# Patient Record
Sex: FEMALE | Race: WHITE | NOT HISPANIC OR LATINO | Employment: PART TIME | URBAN - METROPOLITAN AREA
[De-identification: names, ages, dates, MRNs, and addresses within clinical notes are randomized per-mention and may not be internally consistent; named-entity substitution may affect disease eponyms.]

---

## 2024-07-24 ENCOUNTER — LAB (OUTPATIENT)
Age: 39
End: 2024-07-24
Payer: OTHER GOVERNMENT

## 2024-07-24 ENCOUNTER — OFFICE VISIT (OUTPATIENT)
Age: 39
End: 2024-07-24
Payer: OTHER GOVERNMENT

## 2024-07-24 VITALS
SYSTOLIC BLOOD PRESSURE: 110 MMHG | BODY MASS INDEX: 27.62 KG/M2 | WEIGHT: 176 LBS | DIASTOLIC BLOOD PRESSURE: 72 MMHG | HEIGHT: 67 IN | OXYGEN SATURATION: 100 % | HEART RATE: 70 BPM

## 2024-07-24 DIAGNOSIS — R53.83 FATIGUE, UNSPECIFIED TYPE: Primary | ICD-10-CM

## 2024-07-24 DIAGNOSIS — R53.83 OTHER FATIGUE: Primary | ICD-10-CM

## 2024-07-24 DIAGNOSIS — E55.9 VITAMIN D DEFICIENCY: ICD-10-CM

## 2024-07-24 DIAGNOSIS — R53.83 OTHER FATIGUE: ICD-10-CM

## 2024-07-24 DIAGNOSIS — F41.1 GAD (GENERALIZED ANXIETY DISORDER): ICD-10-CM

## 2024-07-24 LAB
DEPRECATED RDW RBC AUTO: 43.5 FL (ref 37–54)
ERYTHROCYTE [DISTWIDTH] IN BLOOD BY AUTOMATED COUNT: 13.1 % (ref 12.3–15.4)
HCT VFR BLD AUTO: 38.2 % (ref 34–46.6)
HGB BLD-MCNC: 12.4 G/DL (ref 12–15.9)
MCH RBC QN AUTO: 29.2 PG (ref 26.6–33)
MCHC RBC AUTO-ENTMCNC: 32.5 G/DL (ref 31.5–35.7)
MCV RBC AUTO: 90.1 FL (ref 79–97)
PLATELET # BLD AUTO: 357 10*3/MM3 (ref 140–450)
PMV BLD AUTO: 10.6 FL (ref 6–12)
RBC # BLD AUTO: 4.24 10*6/MM3 (ref 3.77–5.28)
WBC NRBC COR # BLD AUTO: 8.37 10*3/MM3 (ref 3.4–10.8)

## 2024-07-24 PROCEDURE — 84443 ASSAY THYROID STIM HORMONE: CPT | Performed by: FAMILY MEDICINE

## 2024-07-24 PROCEDURE — 99204 OFFICE O/P NEW MOD 45 MIN: CPT | Performed by: FAMILY MEDICINE

## 2024-07-24 PROCEDURE — 85027 COMPLETE CBC AUTOMATED: CPT | Performed by: FAMILY MEDICINE

## 2024-07-24 PROCEDURE — 36415 COLL VENOUS BLD VENIPUNCTURE: CPT | Performed by: FAMILY MEDICINE

## 2024-07-24 PROCEDURE — 80053 COMPREHEN METABOLIC PANEL: CPT | Performed by: FAMILY MEDICINE

## 2024-07-24 PROCEDURE — 82306 VITAMIN D 25 HYDROXY: CPT | Performed by: FAMILY MEDICINE

## 2024-07-24 PROCEDURE — 82607 VITAMIN B-12: CPT | Performed by: FAMILY MEDICINE

## 2024-07-24 RX ORDER — SERTRALINE HYDROCHLORIDE 25 MG/1
25 TABLET, FILM COATED ORAL DAILY
Qty: 30 TABLET | Refills: 1 | Status: SHIPPED | OUTPATIENT
Start: 2024-07-24

## 2024-07-24 RX ORDER — MULTIPLE VITAMINS W/ MINERALS TAB 9MG-400MCG
1 TAB ORAL DAILY
COMMUNITY

## 2024-07-24 RX ORDER — VITAMIN B COMPLEX
CAPSULE ORAL DAILY
COMMUNITY

## 2024-07-24 NOTE — PROGRESS NOTES
"Chief Complaint  Establish Care and Fatigue    Subjective        Gayle Winters presents to [facility]  Fatigue  This is a chronic problem. Episode onset: several years. Associated symptoms include fatigue.     She is really tired and no energy. A few years ago she was low on vitamin D a few years ago. She just started back on vitamins a week ago. She takes vitamin D, B complex, magnesium, and multivitamin. She has a little of anxiety. Special needs daughter who doesn't sleep well at night. Wakes up 12 am or 2 am and stays up for several hours and she is up with her daughter. When she is more stressed she is more tired. She doesn't take naps. Regular menstrual cycles with normal flow.           7/24/2024     3:29 PM   PHQ-2/PHQ-9 Depression Screening   Little Interest or Pleasure in Doing Things 0-->not at all   Feeling Down, Depressed or Hopeless 0-->not at all   PHQ-9: Brief Depression Severity Measure Score 0         Objective   Vital Signs:  /72   Pulse 70   Ht 168.9 cm (66.5\")   Wt 79.8 kg (176 lb)   SpO2 100%   BMI 27.98 kg/m²   Estimated body mass index is 27.98 kg/m² as calculated from the following:    Height as of this encounter: 168.9 cm (66.5\").    Weight as of this encounter: 79.8 kg (176 lb).             Physical Exam  Vitals reviewed.   Constitutional:       General: She is not in acute distress.     Appearance: She is overweight. She is not ill-appearing.   Neck:      Thyroid: No thyroid mass, thyromegaly or thyroid tenderness.   Cardiovascular:      Rate and Rhythm: Normal rate and regular rhythm.   Pulmonary:      Effort: Pulmonary effort is normal.      Breath sounds: Normal breath sounds.   Abdominal:      Palpations: There is no hepatomegaly or splenomegaly.      Tenderness: There is no abdominal tenderness.   Neurological:      Mental Status: She is alert.   Psychiatric:         Behavior: Behavior normal.         Thought Content: Thought content " normal.         Judgment: Judgment normal.        Result Review :          25-HYDROXYVITAMIN D LCMS D2+D3 (11/29/2022 16:29)  COMPREHENSIVE METABOLIC PANEL (11/29/2022 16:29)  VITAMIN B12 (11/29/2022 16:29)  TSH, High Sensitivity (11/29/2022 16:29)           Assessment and Plan     Diagnoses and all orders for this visit:    1. Other fatigue (Primary)  -     Comprehensive Metabolic Panel; Future  -     TSH Rfx On Abnormal To Free T4; Future  -     CBC (No Diff); Future  -     Vitamin B12; Future    2. Vitamin D deficiency  -     Vitamin D,25-Hydroxy; Future    3. KIM (generalized anxiety disorder)  -     sertraline (ZOLOFT) 25 MG tablet; Take 1 tablet by mouth Daily.  Dispense: 30 tablet; Refill: 1    Further investigation of fatigue with labs today.  Discussed other possibility could also include stress.  She is open to starting SSRI for anxiety.  Discussed side effects to monitor for.  Notify me if any changes in her health.  Reassess at follow-up visit in 1 month.         Follow Up     Return in about 1 month (around 8/24/2024) for [place] Video Visit anxiety .  Patient was given instructions and counseling regarding her condition or for health maintenance advice. Please see specific information pulled into the AVS if appropriate.     Electronically signed by Karine Olguin MD, 07/24/24, 4:23 PM EDT.

## 2024-07-25 LAB
25(OH)D3 SERPL-MCNC: 33.9 NG/ML (ref 30–100)
ALBUMIN SERPL-MCNC: 4.4 G/DL (ref 3.5–5.2)
ALBUMIN/GLOB SERPL: 1.3 G/DL
ALP SERPL-CCNC: 61 U/L (ref 39–117)
ALT SERPL W P-5'-P-CCNC: 13 U/L (ref 1–33)
ANION GAP SERPL CALCULATED.3IONS-SCNC: 11 MMOL/L (ref 5–15)
AST SERPL-CCNC: 19 U/L (ref 1–32)
BILIRUB SERPL-MCNC: 0.4 MG/DL (ref 0–1.2)
BUN SERPL-MCNC: 12 MG/DL (ref 6–20)
BUN/CREAT SERPL: 15.8 (ref 7–25)
CALCIUM SPEC-SCNC: 9.3 MG/DL (ref 8.6–10.5)
CHLORIDE SERPL-SCNC: 105 MMOL/L (ref 98–107)
CO2 SERPL-SCNC: 24 MMOL/L (ref 22–29)
CREAT SERPL-MCNC: 0.76 MG/DL (ref 0.57–1)
EGFRCR SERPLBLD CKD-EPI 2021: 103 ML/MIN/1.73
GLOBULIN UR ELPH-MCNC: 3.4 GM/DL
GLUCOSE SERPL-MCNC: 84 MG/DL (ref 65–99)
POTASSIUM SERPL-SCNC: 3.9 MMOL/L (ref 3.5–5.2)
PROT SERPL-MCNC: 7.8 G/DL (ref 6–8.5)
SODIUM SERPL-SCNC: 140 MMOL/L (ref 136–145)
TSH SERPL DL<=0.05 MIU/L-ACNC: 0.88 UIU/ML (ref 0.27–4.2)
VIT B12 BLD-MCNC: 566 PG/ML (ref 211–946)

## 2025-07-17 ENCOUNTER — OFFICE VISIT (OUTPATIENT)
Age: 40
End: 2025-07-17
Payer: OTHER GOVERNMENT

## 2025-07-17 ENCOUNTER — LAB (OUTPATIENT)
Facility: HOSPITAL | Age: 40
End: 2025-07-17
Payer: OTHER GOVERNMENT

## 2025-07-17 VITALS
SYSTOLIC BLOOD PRESSURE: 116 MMHG | RESPIRATION RATE: 16 BRPM | DIASTOLIC BLOOD PRESSURE: 70 MMHG | WEIGHT: 188 LBS | BODY MASS INDEX: 29.89 KG/M2

## 2025-07-17 DIAGNOSIS — O20.9 FIRST TRIMESTER BLEEDING: Primary | ICD-10-CM

## 2025-07-17 DIAGNOSIS — Z01.419 ENCOUNTER FOR WELL WOMAN EXAM WITH ROUTINE GYNECOLOGICAL EXAM: ICD-10-CM

## 2025-07-17 DIAGNOSIS — O20.9 FIRST TRIMESTER BLEEDING: ICD-10-CM

## 2025-07-17 LAB
ABO GROUP BLD: NORMAL
RH BLD: NEGATIVE

## 2025-07-17 PROCEDURE — 84702 CHORIONIC GONADOTROPIN TEST: CPT

## 2025-07-17 PROCEDURE — 86900 BLOOD TYPING SEROLOGIC ABO: CPT

## 2025-07-17 PROCEDURE — 86901 BLOOD TYPING SEROLOGIC RH(D): CPT

## 2025-07-17 PROCEDURE — 36415 COLL VENOUS BLD VENIPUNCTURE: CPT

## 2025-07-17 RX ORDER — PRENATAL VIT/IRON FUM/FOLIC AC 27MG-0.8MG
TABLET ORAL DAILY
COMMUNITY

## 2025-07-17 NOTE — PROGRESS NOTES
Subjective   Chief Complaint   Patient presents with    Annual Exam     Has had 3 positive UPt's this week     Gayle Winters is a 40 y.o. year old No obstetric history on file..  Patient's last menstrual period was 2025.  She presents to be seen because of first trimester spotting. Initially she was scheduled for an annual visit but she had 3 positive UPTs this week. At time of visit she noted some dark brown spotting and became emotional and wanted to skip doing a pap smear today and discuss bleeding.     She is new to this area and is a . Both uncomplicated pregnancies but second baby was born with tuberous sclerosis complex (she and her  both tested negative for genetic screening but they were told this was a spontaneous mutation). She is also AMA. She has no other complicating factors. She has been taking a prenatal vitamin. She reports she had some mild cramping Mon and Tues of this week, but is not now.     History of Present Illness         OTHER THINGS SHE WANTS TO DISCUSS TODAY:  Nothing else    The following portions of the patient's history were reviewed and updated as appropriate:current medications, allergies, past medical history, and past social history    Social History    Tobacco Use      Smoking status: Never        Passive exposure: Never      Smokeless tobacco: Never      Review of Systems        Objective   /70   Resp 16   Wt 85.3 kg (188 lb)   LMP 2025   BMI 29.89 kg/m²     Physical Exam  Vitals and nursing note reviewed.   Psychiatric:         Mood and Affect: Mood normal.         Behavior: Behavior normal.         Thought Content: Thought content normal.         Judgment: Judgment normal.         Physical Exam         Lab Review   No data reviewed    Imaging   No data reviewed     Results            Assessment & Plan   Diagnoses and all orders for this visit:    1. First trimester bleeding (Primary)  -     hCG, Quantitative, Pregnancy; Future  -     hCG,  Quantitative, Pregnancy; Future  -     US Ob Transvaginal; Future  -     ABO / Rh; Future    2. Encounter for well woman exam with routine gynecological exam  -     LIQUID-BASED PAP SMEAR WITH HPV GENOTYPING REGARDLESS OF INTERPRETATION (KENISHA,COR,MAD); Future  -     Cancel: LIQUID-BASED PAP SMEAR WITH HPV GENOTYPING REGARDLESS OF INTERPRETATION (KENISHA,COR,MAD)    Pap not done today, as patient wanted to pivot visit from annual exam once she experienced bleeding. Will get serial HCGs and have her back for ultrasound early next week as ultrasound not available today in this clinic. ED precautions given, advised Gayle to abstain from intercourse until we know for sure what is going on. She voiced understanding and appreciation.    The importance of keeping all planned follow-up and taking all medications as prescribed was emphasized.    Return in about 4 days (around 7/21/2025).    No orders of the defined types were placed in this encounter.                Assessment & Plan         This note was electronically signed.    Francisca Garcia, APRN  July 17, 2025

## 2025-07-18 LAB — HCG INTACT+B SERPL-ACNC: 448 MIU/ML

## 2025-07-19 ENCOUNTER — LAB (OUTPATIENT)
Dept: LAB | Facility: HOSPITAL | Age: 40
End: 2025-07-19
Payer: OTHER GOVERNMENT

## 2025-07-19 DIAGNOSIS — O20.9 FIRST TRIMESTER BLEEDING: ICD-10-CM

## 2025-07-19 LAB — HCG INTACT+B SERPL-ACNC: 820 MIU/ML

## 2025-07-19 PROCEDURE — 36415 COLL VENOUS BLD VENIPUNCTURE: CPT

## 2025-07-19 PROCEDURE — 84702 CHORIONIC GONADOTROPIN TEST: CPT

## 2025-07-21 ENCOUNTER — LAB (OUTPATIENT)
Dept: LAB | Facility: HOSPITAL | Age: 40
End: 2025-07-21
Payer: OTHER GOVERNMENT

## 2025-07-21 ENCOUNTER — OFFICE VISIT (OUTPATIENT)
Dept: OBSTETRICS AND GYNECOLOGY | Facility: CLINIC | Age: 40
End: 2025-07-21
Payer: OTHER GOVERNMENT

## 2025-07-21 VITALS
DIASTOLIC BLOOD PRESSURE: 70 MMHG | BODY MASS INDEX: 30.84 KG/M2 | SYSTOLIC BLOOD PRESSURE: 116 MMHG | WEIGHT: 194 LBS | RESPIRATION RATE: 16 BRPM

## 2025-07-21 DIAGNOSIS — O20.9 FIRST TRIMESTER BLEEDING: ICD-10-CM

## 2025-07-21 DIAGNOSIS — O20.9 FIRST TRIMESTER BLEEDING: Primary | ICD-10-CM

## 2025-07-21 LAB — HCG INTACT+B SERPL-ACNC: 934 MIU/ML

## 2025-07-21 PROCEDURE — 36415 COLL VENOUS BLD VENIPUNCTURE: CPT

## 2025-07-21 PROCEDURE — 84702 CHORIONIC GONADOTROPIN TEST: CPT

## 2025-07-21 PROCEDURE — 99213 OFFICE O/P EST LOW 20 MIN: CPT

## 2025-07-21 NOTE — PROGRESS NOTES
Subjective   Chief Complaint   Patient presents with    Follow-up     U/s done today     Gayle Winters is a 40 y.o. year old No obstetric history on file..  Patient's last menstrual period was 06/13/2025.  She presents to be seen because of follow up on first trimester bleeding. She reports occasional, albeit lighter, dark brown spotting/bleeding over the weekend. Mild cramping yesterday, none today so far. No other concerns.     History of Present Illness         OTHER THINGS SHE WANTS TO DISCUSS TODAY:  Nothing else    The following portions of the patient's history were reviewed and updated as appropriate:current medications and allergies    Social History    Tobacco Use      Smoking status: Never        Passive exposure: Never      Smokeless tobacco: Never      Review of Systems        Objective   /70   Resp 16   Wt 88 kg (194 lb)   LMP 06/13/2025   BMI 30.84 kg/m²     Physical Exam  Vitals and nursing note reviewed.   Psychiatric:         Mood and Affect: Mood normal.         Behavior: Behavior normal.         Thought Content: Thought content normal.         Judgment: Judgment normal.         Physical Exam         Lab Review   HCG    Imaging   Pelvic ultrasound report     Results            Assessment & Plan   Diagnoses and all orders for this visit:    1. First trimester bleeding (Primary)  -     US Ob Transvaginal; Future  -     hCG, Quantitative, Pregnancy; Future    HCG nearly doubled from Thurs to Sat; will have her get one more today to follow trend. TVUS today shows no adnexal concerns, and questionable gestational sac, but no yolk sac seen as of yet. Will have her back in 1 week for repeat u/s and will make decision on further hcg levels based on today's level. Reiterated ED precautions for pain and bleeding. She voiced understanding and appreciation.     The importance of keeping all planned follow-up and taking all medications as prescribed was emphasized.    Return in about 1 week (around  7/28/2025) for Repeat u/s.    No orders of the defined types were placed in this encounter.                Assessment & Plan         This note was electronically signed.    Francisca Garcia, JOHNNY  July 21, 2025

## 2025-07-23 ENCOUNTER — CLINICAL SUPPORT (OUTPATIENT)
Dept: OBSTETRICS AND GYNECOLOGY | Facility: CLINIC | Age: 40
End: 2025-07-23
Payer: OTHER GOVERNMENT

## 2025-07-23 ENCOUNTER — LAB (OUTPATIENT)
Dept: LAB | Facility: HOSPITAL | Age: 40
End: 2025-07-23
Payer: OTHER GOVERNMENT

## 2025-07-23 DIAGNOSIS — O20.9 FIRST TRIMESTER BLEEDING: ICD-10-CM

## 2025-07-23 DIAGNOSIS — O26.859 SPOTTING IN PREGNANCY: Primary | ICD-10-CM

## 2025-07-23 LAB — HCG INTACT+B SERPL-ACNC: 1263 MIU/ML

## 2025-07-23 PROCEDURE — 36415 COLL VENOUS BLD VENIPUNCTURE: CPT

## 2025-07-23 PROCEDURE — 84702 CHORIONIC GONADOTROPIN TEST: CPT

## 2025-07-29 ENCOUNTER — LAB (OUTPATIENT)
Dept: LAB | Facility: HOSPITAL | Age: 40
End: 2025-07-29
Payer: OTHER GOVERNMENT

## 2025-07-29 ENCOUNTER — OFFICE VISIT (OUTPATIENT)
Dept: OBSTETRICS AND GYNECOLOGY | Facility: CLINIC | Age: 40
End: 2025-07-29
Payer: OTHER GOVERNMENT

## 2025-07-29 VITALS
BODY MASS INDEX: 30.37 KG/M2 | RESPIRATION RATE: 16 BRPM | WEIGHT: 191 LBS | SYSTOLIC BLOOD PRESSURE: 116 MMHG | DIASTOLIC BLOOD PRESSURE: 70 MMHG

## 2025-07-29 DIAGNOSIS — R30.0 DYSURIA: ICD-10-CM

## 2025-07-29 DIAGNOSIS — O36.80X0 PREGNANCY OF UNKNOWN ANATOMIC LOCATION: ICD-10-CM

## 2025-07-29 DIAGNOSIS — O36.80X0 PREGNANCY OF UNKNOWN ANATOMIC LOCATION: Primary | ICD-10-CM

## 2025-07-29 LAB
ALBUMIN SERPL-MCNC: 4.2 G/DL (ref 3.5–5.2)
ALBUMIN/GLOB SERPL: 1.4 G/DL
ALP SERPL-CCNC: 54 U/L (ref 39–117)
ALT SERPL W P-5'-P-CCNC: 24 U/L (ref 1–33)
ANION GAP SERPL CALCULATED.3IONS-SCNC: 9.6 MMOL/L (ref 5–15)
AST SERPL-CCNC: 23 U/L (ref 1–32)
BACTERIA UR QL AUTO: ABNORMAL /HPF
BILIRUB SERPL-MCNC: 0.4 MG/DL (ref 0–1.2)
BILIRUB UR QL STRIP: NEGATIVE
BUN SERPL-MCNC: 11 MG/DL (ref 6–20)
BUN/CREAT SERPL: 15.9 (ref 7–25)
CALCIUM SPEC-SCNC: 9.4 MG/DL (ref 8.6–10.5)
CHLORIDE SERPL-SCNC: 105 MMOL/L (ref 98–107)
CLARITY UR: ABNORMAL
CO2 SERPL-SCNC: 23.4 MMOL/L (ref 22–29)
COLOR UR: YELLOW
CREAT SERPL-MCNC: 0.69 MG/DL (ref 0.57–1)
DEPRECATED RDW RBC AUTO: 42.6 FL (ref 37–54)
EGFRCR SERPLBLD CKD-EPI 2021: 113.4 ML/MIN/1.73
ERYTHROCYTE [DISTWIDTH] IN BLOOD BY AUTOMATED COUNT: 13.2 % (ref 12.3–15.4)
GLOBULIN UR ELPH-MCNC: 3.1 GM/DL
GLUCOSE SERPL-MCNC: 85 MG/DL (ref 65–99)
GLUCOSE UR STRIP-MCNC: NEGATIVE MG/DL
HCG INTACT+B SERPL-ACNC: 1704 MIU/ML
HCT VFR BLD AUTO: 37.8 % (ref 34–46.6)
HGB BLD-MCNC: 12.4 G/DL (ref 12–15.9)
HGB UR QL STRIP.AUTO: ABNORMAL
HOLD SPECIMEN: NORMAL
HYALINE CASTS UR QL AUTO: ABNORMAL /LPF
KETONES UR QL STRIP: NEGATIVE
LEUKOCYTE ESTERASE UR QL STRIP.AUTO: ABNORMAL
MCH RBC QN AUTO: 29.5 PG (ref 26.6–33)
MCHC RBC AUTO-ENTMCNC: 32.8 G/DL (ref 31.5–35.7)
MCV RBC AUTO: 90 FL (ref 79–97)
NITRITE UR QL STRIP: POSITIVE
PH UR STRIP.AUTO: 6 [PH] (ref 5–8)
PLATELET # BLD AUTO: 334 10*3/MM3 (ref 140–450)
PMV BLD AUTO: 10.1 FL (ref 6–12)
POTASSIUM SERPL-SCNC: 4.2 MMOL/L (ref 3.5–5.2)
PROT SERPL-MCNC: 7.3 G/DL (ref 6–8.5)
PROT UR QL STRIP: NEGATIVE
RBC # BLD AUTO: 4.2 10*6/MM3 (ref 3.77–5.28)
RBC # UR STRIP: ABNORMAL /HPF
REF LAB TEST METHOD: ABNORMAL
SODIUM SERPL-SCNC: 138 MMOL/L (ref 136–145)
SP GR UR STRIP: 1.01 (ref 1–1.03)
SQUAMOUS #/AREA URNS HPF: ABNORMAL /HPF
UROBILINOGEN UR QL STRIP: ABNORMAL
WBC # UR STRIP: ABNORMAL /HPF
WBC NRBC COR # BLD AUTO: 7.41 10*3/MM3 (ref 3.4–10.8)

## 2025-07-29 PROCEDURE — 87077 CULTURE AEROBIC IDENTIFY: CPT

## 2025-07-29 PROCEDURE — 81001 URINALYSIS AUTO W/SCOPE: CPT

## 2025-07-29 PROCEDURE — 36415 COLL VENOUS BLD VENIPUNCTURE: CPT

## 2025-07-29 PROCEDURE — 87186 SC STD MICRODIL/AGAR DIL: CPT

## 2025-07-29 PROCEDURE — 99213 OFFICE O/P EST LOW 20 MIN: CPT

## 2025-07-29 PROCEDURE — 85027 COMPLETE CBC AUTOMATED: CPT

## 2025-07-29 PROCEDURE — 80053 COMPREHEN METABOLIC PANEL: CPT

## 2025-07-29 PROCEDURE — 84702 CHORIONIC GONADOTROPIN TEST: CPT

## 2025-07-29 PROCEDURE — 87086 URINE CULTURE/COLONY COUNT: CPT

## 2025-07-29 NOTE — PROGRESS NOTES
Subjective   Chief Complaint   Patient presents with    Follow-up     U/s done today     Gayle Winters is a 40 y.o. year old No obstetric history on file..  Patient's last menstrual period was 06/13/2025.  She presents to be seen because of pregnancy of unknown location. She reports she has still had some spotting on and off this week. No clots and nothing that looks like definitive tissue, mostly brown. Mild cramping similar to menses. No other pelvic pain.     History of Present Illness         OTHER THINGS SHE WANTS TO DISCUSS TODAY:  Nothing else    The following portions of the patient's history were reviewed and updated as appropriate:current medications and allergies    Social History    Tobacco Use      Smoking status: Never        Passive exposure: Never      Smokeless tobacco: Never      Review of Systems        Objective   /70   Resp 16   Wt 86.6 kg (191 lb)   LMP 06/13/2025   BMI 30.37 kg/m²     Physical Exam  Vitals and nursing note reviewed. Exam conducted with a chaperone present.   Psychiatric:         Mood and Affect: Mood normal.         Behavior: Behavior normal.         Thought Content: Thought content normal.         Judgment: Judgment normal.         Physical Exam         Lab Review   No data reviewed    Imaging   Pelvic ultrasound report     Results            Assessment & Plan   Diagnoses and all orders for this visit:    1. Pregnancy of unknown anatomic location (Primary)  -     hCG, Quantitative, Pregnancy; Future  -     Comprehensive Metabolic Panel; Future  -     CBC (No Diff); Future  -     US Ob Transvaginal; Future    2. Dysuria  -     Urinalysis With Culture If Indicated - Urine, Clean Catch; Future  -     Urinalysis With Culture If Indicated - Urine, Clean Catch      TVUS today continues to show unknown location of pregnancy. Suspicious area seen in left adnexal region, as well as thickened endometrium seen. No fetal pole or embryo visualized as of yet. Will await MD  impression as well as repeat hcg and get CMP and CBC in case of need for methotrexate.     The importance of keeping all planned follow-up and taking all medications as prescribed was emphasized.    Return in about 6 days (around 8/4/2025) for Recheck.    No orders of the defined types were placed in this encounter.                Assessment & Plan         This note was electronically signed.    Francisca Garcia, JOHNNY  July 29, 2025

## 2025-07-30 ENCOUNTER — TELEPHONE (OUTPATIENT)
Dept: OBSTETRICS AND GYNECOLOGY | Facility: CLINIC | Age: 40
End: 2025-07-30

## 2025-07-31 ENCOUNTER — TELEPHONE (OUTPATIENT)
Dept: OBSTETRICS AND GYNECOLOGY | Facility: CLINIC | Age: 40
End: 2025-07-31
Payer: OTHER GOVERNMENT

## 2025-07-31 DIAGNOSIS — O36.80X0 PREGNANCY OF UNKNOWN ANATOMIC LOCATION: Primary | ICD-10-CM

## 2025-07-31 NOTE — TELEPHONE ENCOUNTER
Called  and she stated she was feeling pretty good, no bleeding, left side sort of tightening of muscle, a little pain.  She was instructed if she had pain or bleeding she was to go to ED. Quantative HCG and ultrasound was ordered. She will have those done and see Dr. Briones tomorrow.

## 2025-07-31 NOTE — TELEPHONE ENCOUNTER
Caller: Gayle Winters    Relationship: Self    Best call back number: [phone]    What is the best time to reach you: ASAP    Do you know the name of the person who called: ILIA    What was the call regarding: MISSED CALL    Is it okay if the provider responds through MyChart: CALL BACK

## 2025-08-01 ENCOUNTER — OFFICE VISIT (OUTPATIENT)
Dept: OBSTETRICS AND GYNECOLOGY | Facility: CLINIC | Age: 40
End: 2025-08-01
Payer: OTHER GOVERNMENT

## 2025-08-01 ENCOUNTER — LAB (OUTPATIENT)
Dept: LAB | Facility: HOSPITAL | Age: 40
End: 2025-08-01
Payer: OTHER GOVERNMENT

## 2025-08-01 VITALS — RESPIRATION RATE: 14 BRPM | BODY MASS INDEX: 30.21 KG/M2 | WEIGHT: 190 LBS

## 2025-08-01 DIAGNOSIS — O36.80X0 PREGNANCY OF UNKNOWN ANATOMIC LOCATION: ICD-10-CM

## 2025-08-01 LAB — HCG INTACT+B SERPL-ACNC: 1696 MIU/ML

## 2025-08-01 PROCEDURE — 36415 COLL VENOUS BLD VENIPUNCTURE: CPT

## 2025-08-01 PROCEDURE — 84702 CHORIONIC GONADOTROPIN TEST: CPT

## 2025-08-01 NOTE — PROGRESS NOTES
Subjective   Chief Complaint   Patient presents with    Follow-up       Gayle Winters is a 40 y.o. year old .  Patient's last menstrual period was 2025.  She comes in for follow-up.  She has had inappropriately rising hCGs.  Her last hCG was about 1700.  That was a couple days ago.  She had an ultrasound prior to seeing me today.  The ultrasound does show no evidence of an intrauterine gestational sac.  There is no fluid in the cul-de-sac.  There is no definitive evidence of an ectopic pregnancy in the adnexa although there is a suspicious area that could be consistent with an ectopic.    The following portions of the patient's history were reviewed and updated as appropriate:current medications, allergies, past family history, past medical history, past social history, and past surgical history    Social History    Tobacco Use      Smoking status: Never        Passive exposure: Never      Smokeless tobacco: Never         Objective   Resp 14   Wt 86.2 kg (190 lb)   LMP 2025   BMI 30.21 kg/m²     Lab Review   See HPI    Imaging   See HPI        Assessment   Pregnancy of unknown location with sonographic evidence highly suspicious for ectopic pregnancy but cannot exclude  Rh-.  She has received RhoGAM recently.         Plan   Really need to see today's hCG and correlate today's findings with the ultrasound  Have explained that my partner, Karly Campbell who is on-call this weekend and I really think we need to get the hCG reviewed to discuss these findings.  Ectopic warnings have been reviewed  The importance of keeping all planned follow-up and taking all medications as prescribed was emphasized.  Follow up pending hCGs            This note was electronically signed.    Thaddeus Briones M.D.  2025      Part of this note may be an electronic transcription/translation of spoken language to printed text using the Dragon Dictation System.

## 2025-08-01 NOTE — Clinical Note
You may want to take a look at this lab work which I have ordered stat.  Right now I think she would be a an methotrexate candidate pending review of labs

## 2025-08-02 ENCOUNTER — HOSPITAL ENCOUNTER (EMERGENCY)
Facility: HOSPITAL | Age: 40
Discharge: HOME | End: 2025-08-02
Attending: EMERGENCY MEDICINE
Payer: OTHER GOVERNMENT

## 2025-08-02 VITALS
HEIGHT: 67 IN | RESPIRATION RATE: 16 BRPM | WEIGHT: 190.04 LBS | BODY MASS INDEX: 29.83 KG/M2 | SYSTOLIC BLOOD PRESSURE: 126 MMHG | HEART RATE: 66 BPM | TEMPERATURE: 98.4 F | OXYGEN SATURATION: 100 % | DIASTOLIC BLOOD PRESSURE: 86 MMHG

## 2025-08-02 DIAGNOSIS — O00.102 LEFT TUBAL PREGNANCY WITHOUT INTRAUTERINE PREGNANCY: Primary | ICD-10-CM

## 2025-08-02 DIAGNOSIS — O00.90 ECTOPIC PREGNANCY, UNSPECIFIED LOCATION, UNSPECIFIED WHETHER INTRAUTERINE PREGNANCY PRESENT: Primary | ICD-10-CM

## 2025-08-02 LAB — BACTERIA SPEC AEROBE CULT: ABNORMAL

## 2025-08-02 PROCEDURE — 25010000002 METHOTREXATE PF 100 MG/4ML SOLUTION: Performed by: STUDENT IN AN ORGANIZED HEALTH CARE EDUCATION/TRAINING PROGRAM

## 2025-08-02 PROCEDURE — 99282 EMERGENCY DEPT VISIT SF MDM: CPT

## 2025-08-02 PROCEDURE — 96372 THER/PROPH/DIAG INJ SC/IM: CPT

## 2025-08-02 RX ORDER — METHOTREXATE 25 MG/ML
50 INJECTION, SOLUTION INTRA-ARTERIAL; INTRAMUSCULAR; INTRATHECAL; INTRAVENOUS ONCE
Status: DISCONTINUED | OUTPATIENT
Start: 2025-08-02 | End: 2025-08-02

## 2025-08-02 RX ORDER — METHOTREXATE 25 MG/ML
50 INJECTION, SOLUTION INTRA-ARTERIAL; INTRAMUSCULAR; INTRATHECAL; INTRAVENOUS ONCE
Status: COMPLETED | OUTPATIENT
Start: 2025-08-02 | End: 2025-08-02

## 2025-08-02 RX ADMIN — METHOTREXATE 49 MG: 25 SOLUTION INTRA-ARTERIAL; INTRAMUSCULAR; INTRATHECAL; INTRAVENOUS at 16:40

## 2025-08-02 NOTE — ED PROVIDER NOTES
EMERGENCY DEPARTMENT ENCOUNTER    Pt Name: Gayle Winters  MRN: [id number]  Pt :   1985  Room Number:  24SF/24  Date of encounter:  2025  PCP: Karine Olguin MD  ED Provider: JOHNNY Summers    Historian: Patient    HPI:  Chief Complaint:  ectopic pregnancy    Context: Gayle Winters is a 40 y.o. female who presents to the ED c/o ectopic pregnancy. Pt is a .  Patient's last menstrual period was on .  Patient has had multiple ultrasounds and beta-hCG's performed.  Patient's beta-hCG is not increasing and the ultrasound shows concern of an ectopic.  Patient is followed per Dr. Campbell.  Dr. Campbell and the patient had extensive conversations today.  Patient is sent here for methotrexate injection.  HPI     REVIEW OF SYSTEMS  A chief complaint appropriate review of systems was completed and is negative except as noted in the HPI.     PAST MEDICAL HISTORY  No past medical history on file.    PAST SURGICAL HISTORY  Past Surgical History:   Procedure Laterality Date    WISDOM TOOTH EXTRACTION         FAMILY HISTORY  Family History   Problem Relation Age of Onset    Arthritis Mother     Migraines Mother     Stroke Father 55    No Known Problems Sister     No Known Problems Sister     Diabetes Maternal Grandmother     Cancer Maternal Grandfather         stomach    Hypertension Paternal Grandmother     Breast cancer Paternal Grandmother     Lung cancer Paternal Grandfather     Breast cancer Maternal Aunt     Breast cancer Maternal Aunt     Obesity Paternal Aunt     Cancer Paternal Aunt     Autism Daughter        SOCIAL HISTORY  Social History     Socioeconomic History    Marital status:    Tobacco Use    Smoking status: Never     Passive exposure: Never    Smokeless tobacco: Never   Vaping Use    Vaping status: Never Used   Substance and Sexual Activity    Alcohol use: No    Drug use: No    Sexual activity: Yes     Partners: Male     Birth control/protection: None        ALLERGIES  Patient has no known allergies.    PHYSICAL EXAM  Physical Exam  Vitals and nursing note reviewed.   Constitutional:       General: She is not in acute distress.     Appearance: Normal appearance. She is not ill-appearing.   HENT:      Head: Normocephalic and atraumatic.      Nose: Nose normal.      Mouth/Throat:      Mouth: Mucous membranes are moist.   Eyes:      Extraocular Movements: Extraocular movements intact.      Conjunctiva/sclera: Conjunctivae normal.   Cardiovascular:      Rate and Rhythm: Normal rate and regular rhythm.      Pulses: Normal pulses.      Heart sounds: Normal heart sounds.   Pulmonary:      Effort: Pulmonary effort is normal. No respiratory distress.      Breath sounds: Normal breath sounds.   Abdominal:      General: Bowel sounds are normal. There is no distension.      Tenderness: There is no abdominal tenderness.   Musculoskeletal:         General: Normal range of motion.      Cervical back: Normal range of motion and neck supple.   Skin:     General: Skin is warm and dry.   Neurological:      General: No focal deficit present.      Mental Status: She is alert and oriented to person, place, and time.   Psychiatric:         Mood and Affect: Mood normal.         Behavior: Behavior normal.           LAB RESULTS  Results for orders placed or performed in visit on 08/01/25   hCG, Quantitative, Pregnancy    Collection Time: 08/01/25 12:04 PM    Specimen: Blood   Result Value Ref Range    HCG Quantitative 1,696.00 mIU/mL       If labs were ordered, I independently reviewed the results and considered them in treating the patient.    RADIOLOGY  No orders to display     [] Radiologist's Report Reviewed:  I ordered and independently interpreted the above noted radiographic studies.  See radiologist's dictation for official interpretation.      PROCEDURES    Procedures    No orders to display       MEDICATIONS GIVEN IN ER    Medications   methotrexate PF injection 98 mg (49 mg  Intramuscular Subsequent Bag/Syringe 25 1915)       MEDICAL DECISION MAKING, PROGRESS, and CONSULTS   Medical Decision Making  Gayle Winters is a 40 y.o. female who presents to the ED c/o ectopic pregnancy. Pt is a .  Patient's last menstrual period was on .  Patient has had multiple ultrasounds and beta-hCG's performed.  Patient's beta-hCG is not increasing and the ultrasound shows concern of an ectopic.  Patient is followed per Dr. Campbell.  Dr. Campbell and the patient had extensive conversations today.  Patient is sent here for methotrexate injection.      Problems Addressed:  Ectopic pregnancy, unspecified location, unspecified whether intrauterine pregnancy present: acute illness or injury     Details: Dr. Campbell was consulted.  Dr. Campbell came in to evaluate the patient.  Methotrexate administered.  Pt is aware of need for repeat lab work on day 4 and day 7.  Pt to return to the ER for any complications and concerns.         Discussion below represents my analysis of pertinent findings related to patient's condition, differential diagnosis, treatment plan and final disposition.    Assessment includes with Differential diagnosis including but is not limited to: ectopic pregnancy, miscarriage      Additional sources  Discussed/ obtained information from independent historians:   [] Spouse  [] Parent  [] Family member  [] Friend  [] EMS   [] Other:  External (non-ED) record review:   [] Inpatient record:   [] Office record:   [x] Outpatient record:   [x] Prior Outpatient labs:   [x] Prior Outpatient radiology:   [] Primary Care record:   [] Outside ED record:   [] Other:   Patient's care impacted by:   [] Diabetes  [] Hypertension  [] Hyperlipidemia  [] Hypothyroidism   [] Coronary Artery Disease  [] Congestive Heart Failure   [] COPD   [] Cancer   [] Obesity  [] GERD   [] Tobacco Abuse   [] Substance Abuse    [] Anxiety   [] Depression   [x] Other:   Care significantly affected by Social Determinants  of Health (housing and economic circumstances, unemployment)    [] Yes     [x] No   If yes, Patient's care significantly limited by  Social Determinants of Health including:   [] Inadequate housing   [] Low income   [] Alcoholism and drug addiction in family   [] Problems related to primary support group   [] Unemployment   [] Problems related to employment   [] Other Social Determinants of Health:     Orders placed during this visit:  No orders of the defined types were placed in this encounter.      I considered prescription management  with:   [] Pain medication  [] Antiviral  [] Antibiotic   [] Other:   Rationale:  Additional orders considered but not ordered:  The following testing was considered but ultimately not selected after discussion with patient/family:  ED Course:    ED Course as of 08/02/25 1715   Sat Aug 02, 2025   1525 Dr. Campbell consulted at this time.   [KG]   1531 Dr. Campbell advises no additional blood work is needed at today's visit.  She advises that no additional imaging is needed at today's visit.  Patient will have lab work performed on day 4 and day 7.  She has already put the orders into the system.  Pt to get methotrexate IM.    [KG]   1555 Methotrexate dosing confirmed per pharmacy. [KG]   1648 Dr. Campbell at bedside to reiterate treatment plan with the patient.  She also discussed follow-up and when to return to the ED. [KG]      ED Course User Index  [KG] Cora Ojeda, JOHNNY            DIAGNOSIS  Final diagnoses:   Ectopic pregnancy, unspecified location, unspecified whether intrauterine pregnancy present       DISPOSITION    DISCHARGE    Patient discharged in stable condition.    Reviewed implications of results, diagnosis, meds, responsibility to follow up, warning signs and symptoms of possible worsening, potential complications and reasons to return to ER.    Patient/Family voiced understanding of above instructions.    Discussed plan for discharge, as there is no emergent indication  for admission.  Pt/family is agreeable and understands need for follow up and possible repeat testing.  Pt/family is aware that discharge does not mean that nothing is wrong but that it indicates no emergency is currently present that requires admission and they must continue care with follow-up as given below or with a physician of their choice.     FOLLOW-UP  Karly Campbell MD  [address]  [address]  [place] [id number]  [phone]          Karine Olguin MD  [address] [facility]  [address]  [place] [id number]  [phone]               Medication List      No changes were made to your prescriptions during this visit.          ED Disposition       ED Disposition   Discharge    Condition   Stable    Comment   --                   Cora Ojeda, APRN  08/02/25 9000

## 2025-08-02 NOTE — DISCHARGE INSTRUCTIONS
Follow-up with OB/GYN as planned and discussed.  You have lab work ordered.  Lab work will be obtained on day 4 and day 7.    Return to the ED needed.  Return to the ER for any increasing pain, increasing bleeding or any other concerns.

## 2025-08-02 NOTE — PROGRESS NOTES
Called patient to discuss results of her labs.     Patient has 3 cm mass in left adnexa adjacent to ovary seen on ultrasound yesterday and has been followed for pregnancy of unknown location with suspected ectopic pregnancy.     HCG's (last values serially):   HCG Quantitative   Date/Time Value Ref Range Status   08/01/2025 1204 1,696.00 mIU/mL Final   07/29/2025 1241 1,704.00 mIU/mL Final   07/23/2025 1322 1,263.00 mIU/mL Final       We discussed that her beta-hCG is plateauing and this is consistent with an ectopic pregnancy.  We discussed that beta is not rising like a normal pregnancy.    We discussed treatment options are either laparoscopic salpingectomy which is surgery to remove the ectopic pregnancy and possibly the tube or methotrexate administration.  We discussed that methotrexate helps to interrupt DNA synthesis and then the body helps to reabsorb the pregnancy on its own.  We discussed that methotrexate is the best way to avoid surgery.  We discussed however that if the methotrexate is ineffective or if she has any severe pain or heavy vaginal bleeding, then I would recommend that we proceed with surgery.  Patient is interested in methotrexate.    We discussed that she is a good candidate for methotrexate as the adnexal mass is less than 4 cm and her beta-hCG is less than 5000 and she is able to follow-up with us.  We discussed that she should avoid alcohol, folic acid, vigorous exercise, or sunlight exposure while on methotrexate.  We discussed that the side effects can include nausea vomiting as well as some abdominal pain in 2 to 3 days after administration.  We discussed that she can take Tylenol and ibuprofen for pain management but if her pain is not controlled with either of these, then she should come into the emergency department.    We discussed the general administration regimen which would be a dose today which would be given in the emergency department and then we will follow-up with the  beta-hCG level on posttreatment day 4 and day 7 and if the decrease is greater than 15% then we will continue to follow her beta-hCG's weekly until reaching a nonpregnant level but if the decrease is less than 15% then we will readminister methotrexate at that time on day 7.  She is agreeable to this plan.    I discussed with ER physician that this patient will be presenting to the ER for methotrexate. The dose is methotrexate 50 mg/m2 IM x 1.     Karly Campbell MD  Obstetrics and Gynecology  [facility]

## 2025-08-04 ENCOUNTER — TELEPHONE (OUTPATIENT)
Dept: OBSTETRICS AND GYNECOLOGY | Facility: CLINIC | Age: 40
End: 2025-08-04
Payer: OTHER GOVERNMENT

## 2025-08-06 ENCOUNTER — TELEPHONE (OUTPATIENT)
Dept: OBSTETRICS AND GYNECOLOGY | Facility: CLINIC | Age: 40
End: 2025-08-06
Payer: OTHER GOVERNMENT

## 2025-08-20 ENCOUNTER — OFFICE VISIT (OUTPATIENT)
Age: 40
End: 2025-08-20
Payer: OTHER GOVERNMENT

## 2025-08-20 VITALS
WEIGHT: 190.25 LBS | BODY MASS INDEX: 29.86 KG/M2 | DIASTOLIC BLOOD PRESSURE: 74 MMHG | TEMPERATURE: 97.8 F | HEIGHT: 67 IN | HEART RATE: 81 BPM | SYSTOLIC BLOOD PRESSURE: 110 MMHG | OXYGEN SATURATION: 99 %

## 2025-08-20 DIAGNOSIS — R42 DIZZINESS: Primary | ICD-10-CM

## 2025-08-20 PROCEDURE — 99213 OFFICE O/P EST LOW 20 MIN: CPT | Performed by: FAMILY MEDICINE

## 2025-08-20 PROCEDURE — 93000 ELECTROCARDIOGRAM COMPLETE: CPT | Performed by: FAMILY MEDICINE
